# Patient Record
Sex: FEMALE
[De-identification: names, ages, dates, MRNs, and addresses within clinical notes are randomized per-mention and may not be internally consistent; named-entity substitution may affect disease eponyms.]

---

## 2020-02-05 ENCOUNTER — HOSPITAL ENCOUNTER (EMERGENCY)
Dept: HOSPITAL 5 - ED | Age: 60
Discharge: HOME | End: 2020-02-05
Payer: COMMERCIAL

## 2020-02-05 VITALS — SYSTOLIC BLOOD PRESSURE: 134 MMHG | DIASTOLIC BLOOD PRESSURE: 88 MMHG
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
## 2020-02-05 DIAGNOSIS — S62.646A: Primary | ICD-10-CM

## 2020-02-05 DIAGNOSIS — Z79.899: ICD-10-CM
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
## 2020-02-05 NOTE — XRAY REPORT
RIGHT HAND 2 VIEWS



INDICATION:  injury/pain. 



COMPARISON: None.



IMPRESSION:  Nondisplaced fracture through the proximal phalanx of the fifth digit is identified. No 
calcified callus is identified. The remaining bony structures are intact.  No significant DJD.



Signer Name: Abhinav Dias Jr, MD 

Signed: 2/5/2020 9:53 AM

 Workstation Name: [id number]

## 2020-02-05 NOTE — EMERGENCY DEPARTMENT REPORT
ED Upper Extremity Inj HPI





- General


Chief Complaint: Extremity Injury, Lower


Stated Complaint: BROKEN FINGER


Time Seen by Provider: 02/05/20 11:00


Source: patient


Mode of arrival: Ambulatory


Limitations: No Limitations





- History of Present Illness


Initial Comments: 





This is a 59-year-old female nontoxic, well nourished in appearance, no acute 

signs of distress presents to the ED with c/o of right 5th finger pain 1 day.  

Patient stated that she had a trip and fall and twisted her finger.  Patient 

denies any other trauma.  Patient denies any numbness, tingling, fever, chills, 

nausea, vomiting, chest pain, shortness of breath, headache, stiff neck.  

Patient denies any joint swelling or joint redness.  Patient stated has some 

decreased range of motion.  Patient denies any allergies or significant past 

medical history.


MD Complaint: Injury to:: right, finger


-: days(s) (1)


Other Extremity Injury: Fingers: Right


Other Injuries: none


Severity scale (0 -10): 8


Improves With: rest


Worsens With: movement of extremity


Context: fall


Associated Symptoms: denies other symptoms.  denies: weakness, numbness, neck 

pain, suspects foreign body, nausea/vomiting, heard/felt popping sensat





- Related Data


                                  Previous Rx's











 Medication  Instructions  Recorded  Last Taken  Type


 


Acetaminophen/Codeine [Tylenol 1 tab PO Q6H PRN #12 tab 02/05/20 Unknown Rx





/Codeine # 3 tab]    











                                    Allergies











Allergy/AdvReac Type Severity Reaction Status Date / Time


 


No Known Allergies Allergy   Verified 02/05/20 10:25














ED Review of Systems


ROS: 


Stated complaint: BROKEN FINGER


Other details as noted in HPI





Constitutional: denies: chills, fever


Eyes: denies: eye pain, eye discharge, vision change


ENT: denies: ear pain, throat pain


Respiratory: denies: cough, shortness of breath, wheezing


Cardiovascular: denies: chest pain, palpitations


Endocrine: no symptoms reported


Gastrointestinal: denies: abdominal pain, nausea, diarrhea


Genitourinary: denies: urgency, dysuria, discharge


Musculoskeletal: denies: back pain, joint swelling, arthralgia


Skin: denies: rash, lesions


Neurological: denies: headache, weakness, paresthesias


Psychiatric: denies: anxiety, depression


Hematological/Lymphatic: denies: easy bleeding, easy bruising





ED Past Medical Hx





- Past Medical History


Previous Medical History?: No





- Surgical History


Past Surgical History?: No





- Medications


Home Medications: 


                                Home Medications











 Medication  Instructions  Recorded  Confirmed  Last Taken  Type


 


Acetaminophen/Codeine [Tylenol 1 tab PO Q6H PRN #12 tab 02/05/20  Unknown Rx





/Codeine # 3 tab]     














ED Physical Exam





- General


Limitations: No Limitations


General appearance: alert, in no apparent distress





- Head


Head exam: Present: atraumatic, normocephalic





- Extremities Exam


Extremities exam: Present: normal inspection, full ROM, tenderness, normal 

capillary refill.  Absent: joint swelling





- Expanded Upper Extremity Exam


  ** Right


General: Present: normal inspection


Shoulder Exam: Present: normal inspection, full ROM.  Absent: tenderness, 

swelling


Upper Arm exam: Present: normal inspection, full ROM.  Absent: tenderness, 

swelling


Elbow exam: Present: normal inspection, full ROM.  Absent: tenderness, swelling


Forearm Wrist exam: Present: normal inspection, full ROM.  Absent: tenderness, 

swelling


Hand Wrist exam: Present: normal inspection, full ROM, tenderness, swelling, 

ecchymosis.  Absent: abrasion, laceration, deformity, crepidus, dislocation, 

erythema, amputation, nail avulsion, subungual hematoma


Hand L/R Back: 


                            __________________________














                            __________________________





 1 - pain here





Vascular: Present: vascular compromise, normal capillary refill





- Back Exam


Back exam: Present: normal inspection, full ROM





- Neurological Exam


Neurological exam: Present: alert, oriented X3, normal gait





- Psychiatric


Psychiatric exam: Present: normal affect, normal mood





- Skin


Skin exam: Present: warm, dry, intact, normal color.  Absent: rash





ED Course


                                   Vital Signs











  02/05/20





  09:32


 


Temperature 97.6 F


 


Pulse Rate 74


 


Respiratory 16





Rate 


 


Blood Pressure 134/88





[Right] 


 


O2 Sat by Pulse 97





Oximetry 














- Reevaluation(s)


Reevaluation #1: 





02/05/20 11:39


Patient is speaking in full sentences with no signs of distress noted.





ED Medical Decision Making





- Medical Decision Making





This is a 59-year-old female that presents with right 5th finger lac.  Patient 

is stable and was examined by me.  I referred patient to an orthopedic doctor 

for further evaluation for possible MRI.  X-ray has been obtained and dictated 

by the radiologist.  Patient is notified of the x-ray report with noted by the 

patient.  no joint redness or swelling. Not warm to touch. No signs of 

cellulites present. Patient received a frog metal splint to 5th finger. Post 

splint assessment: neurovasular intact; normal cap refill <2 second; normal 

sensation; denies decreaed sensation; normal ROM of digits. Patient was 

instructed to RICE therapy. Patient is discharged with Tylenol #3. At time of 

discharge, the patient does not seem toxic or ill in appearance.  No acute signs

 of distress noted.  Patient agrees to discharge treatment plan of care.  No 

further questions noted by the patient.


Critical care attestation.: 


If time is entered above; I have spent that time in minutes in the direct care 

of this critically ill patient, excluding procedure time.








ED Disposition


Clinical Impression: 


Phalanx, distal fracture of finger


Qualifiers:


 Encounter type: initial encounter Finger: little finger Fracture type: closed 

Fracture alignment: nondisplaced Laterality: right Qualified Code(s): S62.666A -

 Nondisplaced fracture of distal phalanx of right little finger, initial 

encounter for closed fracture





Disposition: DC-01 TO HOME OR SELFCARE


Is pt being admited?: No


Does the pt Need Aspirin: No


Condition: Stable


Instructions:  Acetaminophen/Codeine (By mouth), Finger Fracture (ED)


Additional Instructions: 


Follow-up with a orthopedic doctor in 3-5 days or if symptoms worsen and 

continue return to emergency room as soon as possible. 





Do not operate any machinery while taking Tylenol with codeine as this may cause

 drowsiness.


Prescriptions: 


Acetaminophen/Codeine [Tylenol /Codeine # 3 tab] 1 tab PO Q6H PRN #12 tab


 PRN Reason: Pain , Severe (7-10)


Referrals: 


PRIMARY CARE,MD [Primary Care Provider] - 3-5 Days


ANASTASIA MCLAUGHLIN MD [Staff Physician] - 3-5 Days


Forms:  Work/School Release Form(ED)